# Patient Record
Sex: FEMALE | Race: WHITE | NOT HISPANIC OR LATINO | Employment: FULL TIME | URBAN - METROPOLITAN AREA
[De-identification: names, ages, dates, MRNs, and addresses within clinical notes are randomized per-mention and may not be internally consistent; named-entity substitution may affect disease eponyms.]

---

## 2024-02-04 ENCOUNTER — HOSPITAL ENCOUNTER (EMERGENCY)
Facility: HOSPITAL | Age: 19
Discharge: HOME | End: 2024-02-04
Payer: COMMERCIAL

## 2024-02-04 VITALS
BODY MASS INDEX: 21.16 KG/M2 | RESPIRATION RATE: 18 BRPM | SYSTOLIC BLOOD PRESSURE: 102 MMHG | OXYGEN SATURATION: 98 % | WEIGHT: 115 LBS | TEMPERATURE: 97.2 F | DIASTOLIC BLOOD PRESSURE: 62 MMHG | HEIGHT: 62 IN | HEART RATE: 91 BPM

## 2024-02-04 DIAGNOSIS — Z01.419 NORMAL VAGINAL EXAM: Primary | ICD-10-CM

## 2024-02-04 PROCEDURE — 99283 EMERGENCY DEPT VISIT LOW MDM: CPT

## 2024-02-04 ASSESSMENT — PAIN - FUNCTIONAL ASSESSMENT: PAIN_FUNCTIONAL_ASSESSMENT: 0-10

## 2024-02-04 ASSESSMENT — COLUMBIA-SUICIDE SEVERITY RATING SCALE - C-SSRS
1. IN THE PAST MONTH, HAVE YOU WISHED YOU WERE DEAD OR WISHED YOU COULD GO TO SLEEP AND NOT WAKE UP?: NO
6. HAVE YOU EVER DONE ANYTHING, STARTED TO DO ANYTHING, OR PREPARED TO DO ANYTHING TO END YOUR LIFE?: NO
2. HAVE YOU ACTUALLY HAD ANY THOUGHTS OF KILLING YOURSELF?: NO

## 2024-02-04 ASSESSMENT — PAIN SCALES - GENERAL: PAINLEVEL_OUTOF10: 2

## 2024-02-04 NOTE — ED PROVIDER NOTES
EMERGENCY MEDICINE EVALUATION NOTE    History of Present Illness     Chief Complaint:   Chief Complaint   Patient presents with    Foreign Body in Vagina     Pt states she's got a tampon stuck in her vagina. Pt tried to take it out herself and couldn't do it.        HPI: Lori Mendosa is a 18 y.o. female presents with a chief complaint of possible retained tampon in vagina.  Patient reports that she put a tampon in yesterday and does not remember taking it out.  She states that she has a little discomfort in the area and is concerned she potentially has a retained tampon.  She reports that it was put in yesterday in the afternoon and then she took a nap and woke up for a brief period time and then slept throughout the night.  Patient denies any other symptoms at this time.  Patient denies any foul-smelling discharge from her vaginal area.  Patient denies any fevers or chills.  Patient denies any chance of pregnancy.  Patient denies any dysuria.    Previous History     Past Medical History:   Diagnosis Date    Other specified health status     Patient denies medical problems     No past surgical history on file.     No family history on file.  Allergies   Allergen Reactions    Shellfish Containing Products Hives     No current outpatient medications    Physical Exam     Appearance: Alert, oriented , cooperative,  in no acute distress.      Skin: Intact,  dry skin, no lesions, rash, petechiae or purpura.      Eyes: PERRLA, EOMs intact,  Conjunctiva pink      ENT: Hearing grossly intact.     Neck: Supple. Trachea at midline.      Pulmonary: Clear bilaterally. No rales, rhonchi or wheezing. No accessory muscle use or stridor.     Cardiac: Normal rate and rhythm without murmur     Abdomen: Soft, nontender, active bowel sounds.    : Normal external genitalia.  Pelvic exam performed with no tampon noted in vaginal vault.  Cervix visualized with closed os.  Old blood present in vaginal vault.  Cervix lifted and no tampon  "noted behind.  No cervical motion tenderness.     Musculoskeletal: Full range of motion.      Neurological:Cranial nerves II through XII are grossly intact, normal sensation, no weakness, no focal findings identified.     Results   Labs Reviewed - No data to display  No orders to display         ED Course & Medical Decision Making   Medications - No data to display  Heart Rate:  [66]   Temperature:  [36.2 °C (97.2 °F)]   Respirations:  [18]   BP: (109)/(57)   Height:  [157.5 cm (5' 2\")]   Weight:  [52.2 kg (115 lb)]   Pulse Ox:  [98 %]    Diagnoses as of 02/04/24 0918   Normal vaginal exam     Lori Mendosa is a 18 y.o. female presents with a chief complaint of possible retained tampon in vagina.  Patient reports that she put a tampon in yesterday and does not remember taking it out.  She states that she has a little discomfort in the area and is concerned she potentially has a retained tampon.  She reports that it was put in yesterday in the afternoon and then she took a nap and woke up for a brief period time and then slept throughout the night.  Patient denies any other symptoms at this time.  Patient denies any foul-smelling discharge from her vaginal area.  Patient denies any fevers or chills.  Patient's workup today included a vaginal exam.  Patient's vaginal exam did not display any tampon present.  Patient had no cervical motion tenderness.  Discussed plan of care of the patient.  I did not see a tampon present and patient did not appear to have any discomfort so we discussed plan of care and she will be discharged to follow-up with OB.  Patient was encouraged to return the emergency room immediate with any worsening symptoms such as abdominal pain fever or chills.  Patient is in agreement plan of care.    Procedures   Procedures    Diagnosis     1. Normal vaginal exam        Disposition   Discharge    ED Prescriptions    None         Disclaimer: This note was dictated by speech recognition. Minor errors in " transcription may be present. Please call if questions.       Johnson Borjas PA-C  02/04/24 0984